# Patient Record
Sex: FEMALE | Race: WHITE | NOT HISPANIC OR LATINO | Employment: FULL TIME | URBAN - METROPOLITAN AREA
[De-identification: names, ages, dates, MRNs, and addresses within clinical notes are randomized per-mention and may not be internally consistent; named-entity substitution may affect disease eponyms.]

---

## 2018-05-25 ENCOUNTER — GYNECOLOGY VISIT (OUTPATIENT)
Dept: OBGYN | Facility: CLINIC | Age: 58
End: 2018-05-25
Payer: COMMERCIAL

## 2018-05-25 ENCOUNTER — HOSPITAL ENCOUNTER (OUTPATIENT)
Facility: MEDICAL CENTER | Age: 58
End: 2018-05-25
Attending: OBSTETRICS & GYNECOLOGY
Payer: COMMERCIAL

## 2018-05-25 VITALS
HEIGHT: 64 IN | WEIGHT: 157 LBS | DIASTOLIC BLOOD PRESSURE: 68 MMHG | BODY MASS INDEX: 26.8 KG/M2 | SYSTOLIC BLOOD PRESSURE: 103 MMHG

## 2018-05-25 DIAGNOSIS — Z01.419 WELL WOMAN EXAM: ICD-10-CM

## 2018-05-25 DIAGNOSIS — Z11.51 SCREENING FOR HUMAN PAPILLOMAVIRUS (HPV): ICD-10-CM

## 2018-05-25 DIAGNOSIS — M85.80 OSTEOPENIA, UNSPECIFIED LOCATION: ICD-10-CM

## 2018-05-25 DIAGNOSIS — Z12.4 SCREENING FOR MALIGNANT NEOPLASM OF CERVIX: ICD-10-CM

## 2018-05-25 DIAGNOSIS — Z12.31 SCREENING MAMMOGRAM, ENCOUNTER FOR: ICD-10-CM

## 2018-05-25 PROCEDURE — 88175 CYTOPATH C/V AUTO FLUID REDO: CPT

## 2018-05-25 PROCEDURE — 87624 HPV HI-RISK TYP POOLED RSLT: CPT

## 2018-05-25 PROCEDURE — 99396 PREV VISIT EST AGE 40-64: CPT | Performed by: OBSTETRICS & GYNECOLOGY

## 2018-05-25 NOTE — PROGRESS NOTES
Ethel Machado is a 57 y.o.  female who presents for her Annual Gynecologic Exam. Prior patient of mine from [facility] HPI Comments: Pt presents for her annual well woman exam.   Pt has no complaints.  No LMP recorded. Patient is postmenopausal.    Mammogram due 2017  Dexa scan - osteopenia, 2017  Pap  normal    Review of Systems:   Pertinent positives documented in HPI and all other systems reviewed & are negative    PGYN Hx: no hx STDs, no abnl pap, hx of ovarian cyst    All PMH, PSH, allergies, social history and FH reviewed and updated today:  Past Medical History:   Diagnosis Date   • Brachial neuritis or radiculitis NOS    • Cervicalgia    • Displacement of lumbar intervertebral disc without myelopathy    • FH: breast cancer     maternal   • Irregular menstrual cycle    • Morbid obesity (HCC)    • Onychomycosis     bilateral   • Other and unspecified ovarian cyst    • Pap smear 2008   • S/P appendectomy    • S/P augmentation mammoplasty     bilateral   • Screening mammogram 10/17/07   • Toxic shock syndrome (HCC)     fallopian tube dysfunction     Past Surgical History:   Procedure Laterality Date   • TIBIA PLATEAU ORIF Left 2015    Procedure: TIBIA PLATEAU ORIF;  Surgeon: Lang Rojas M.D.;  Location: SURGERY [facility];  Service:      Medications:   Current Outpatient Prescriptions Ordered in [facility]   Medication Sig Dispense Refill   • HYDROmorphone (DILAUDID) 2 MG Tab Take 0.5-1 Tabs by mouth every four hours as needed for Severe Pain. 20 Tab 0   • ibuprofen (MOTRIN) 600 MG Tab Take 1 Tab by mouth every 6 hours as needed for Moderate Pain or Fever (Temp> 101). 30 Tab 3   • cyclobenzaprine (FLEXERIL) 10 MG TABS Take 1 Tab by mouth 3 times a day as needed for Mild Pain or Muscle Spasms. 15 Tab 0     No current Epic-ordered facility-administered medications on file.      Patient has no known allergies.  Social History     Social History   • Marital status:       "Spouse name: N/A   • Number of children: N/A   • Years of education: N/A     Social History Main Topics   • Smoking status: Former Smoker     Years: 23.00     Types: Cigarettes   • Smokeless tobacco: Never Used   • Alcohol use Yes      Comment: rare   • Drug use: No   • Sexual activity: Yes     Partners: Male     Other Topics Concern   • Exercise No     Social History Narrative   • No narrative on file     Family History   Problem Relation Age of Onset   • Hyperlipidemia Mother    • Heart Attack Father    • Hypertension Father    • Heart Disease Father    • Hyperlipidemia Father    • Heart Disease Sister    • Hyperlipidemia Sister           Objective:   Vital measurements:  Blood pressure 103/68, height 1.626 m (5' 4\"), weight 71.2 kg (157 lb), not currently breastfeeding.  Body mass index is 26.95 kg/m². (Goal BM I>18 <25)    Physical Exam   Nursing note and vitals reviewed.  Constitutional: She is oriented to person, place, and time. She appears well-developed and well-nourished. No distress.     HEENT:   Head: Normocephalic and atraumatic.   Right Ear: External ear normal.   Left Ear: External ear normal.   Nose: Nose normal.   Eyes: Conjunctivae and EOM are normal. Pupils are equal, round, and reactive to light. No scleral icterus.     Neck: Normal range of motion. Neck supple. No tracheal deviation present. No thyromegaly present. No cervical or supraclavicular lymphadenopathy.    Pulmonary/Chest: Effort normal and breath sounds normal. No respiratory distress. She has no wheezes. She has no rales. She exhibits no tenderness.     Cardiovascular: Regular, rate and rhythm. No edema.    Breast:  Symmetrical, normal consistency without masses., No dimpling or skin changes, Normal nipples without discharge, no axillary lymphadenopathy    Abdominal: Soft. Bowel sounds are normal. She exhibits no distension and no mass. No tenderness. She has no rebound and no guarding.     Genitourinary:  Pelvic exam was performed " with patient supine.  External genitalia with no abnormal pigmentation, labial fusion, rash, tenderness, lesion or injury to the labia bilaterally.  BUS normal  Vagina is pink and moist with no lesions, foul discharge, erythema, tenderness or bleeding. No foreign body around the vagina or signs of injury.   Cervix exhibits no motion tenderness, no discharge and no friability, no lesions.   Uterus is not deviated, not enlarged, not fixed and not tender.  Right adnexa displays no mass, no tenderness and no fullness.  Left adnexa displays no mass, no tenderness and no fullness.     Musculoskeletal: Normal range of motion. Non tender. She exhibits no edema and no tenderness.     Lymphadenopathy: She has no cervical or supraclavicular adenopathy.     Neurological: She is alert and oriented to person, place, and time. She exhibits normal muscle tone.     Skin: Skin is warm and dry. No rash noted. She is not diaphoretic. No erythema. No pallor.     Psychiatric: She has a normal mood and affect. Her behavior is normal. Judgment and thought content normal.        Assessment:     1. Well woman exam     2. Screening mammogram, encounter for  MA-MAMMO SCREENING BILAT W/ARI W/CAD   3. Screening for human papillomavirus (HPV)     4. Screening for malignant neoplasm of cervix     5. Osteopenia, unspecified location           Plan:   Pap and physical exam performed  Self breast awareness discussed.  Counseling: breast self exam and mammography screening  Encouraged exercise and proper diet.  Mammograms annually. Patient given order for screening ari mammogram.  See medications and orders placed in encounter report.  Weight bearing exercise daily to strengthen bones  Calcium 1200mg daily and 800 IU daily

## 2018-05-26 DIAGNOSIS — Z11.51 SCREENING FOR HUMAN PAPILLOMAVIRUS (HPV): ICD-10-CM

## 2018-05-26 DIAGNOSIS — Z12.4 SCREENING FOR MALIGNANT NEOPLASM OF CERVIX: ICD-10-CM

## 2018-05-26 DIAGNOSIS — Z01.419 WELL WOMAN EXAM: ICD-10-CM

## 2018-05-30 LAB
CYTOLOGY REG CYTOL: NORMAL
HPV HR 12 DNA CVX QL NAA+PROBE: NEGATIVE
HPV16 DNA SPEC QL NAA+PROBE: NEGATIVE
HPV18 DNA SPEC QL NAA+PROBE: NEGATIVE
SPECIMEN SOURCE: NORMAL

## 2021-02-15 ENCOUNTER — NURSE TRIAGE (OUTPATIENT)
Dept: HEALTH INFORMATION MANAGEMENT | Facility: OTHER | Age: 61
End: 2021-02-15

## 2021-02-15 NOTE — TELEPHONE ENCOUNTER
Hands have been turning red and hot, then cold for several months.      Pt states when her hands turn cold, they turn back to her a normal color skin color.    Denies pain and/or any other symptoms.     Advised to see PCP.  Has an appointment to establish care with new PCP.  Pt only wants to see a MD, not a PA or APRN.     Advised to call back if symptoms worsen or go to the ED.

## 2021-02-22 ENCOUNTER — OFFICE VISIT (OUTPATIENT)
Dept: MEDICAL GROUP | Facility: PHYSICIAN GROUP | Age: 61
End: 2021-02-22
Payer: COMMERCIAL

## 2021-02-22 VITALS
OXYGEN SATURATION: 98 % | RESPIRATION RATE: 12 BRPM | WEIGHT: 141 LBS | DIASTOLIC BLOOD PRESSURE: 80 MMHG | SYSTOLIC BLOOD PRESSURE: 118 MMHG | TEMPERATURE: 97.6 F | HEIGHT: 63 IN | BODY MASS INDEX: 24.98 KG/M2 | HEART RATE: 72 BPM

## 2021-02-22 DIAGNOSIS — D64.9 NORMOCYTIC ANEMIA: ICD-10-CM

## 2021-02-22 DIAGNOSIS — M54.12 BRACHIAL NEURITIS OR RADICULITIS: ICD-10-CM

## 2021-02-22 DIAGNOSIS — E78.5 DYSLIPIDEMIA: ICD-10-CM

## 2021-02-22 PROCEDURE — 99203 OFFICE O/P NEW LOW 30 MIN: CPT | Performed by: INTERNAL MEDICINE

## 2021-02-22 RX ORDER — TRAMADOL HYDROCHLORIDE 50 MG/1
TABLET ORAL
COMMUNITY
End: 2021-02-22

## 2021-02-22 RX ORDER — PRAVASTATIN SODIUM 40 MG
40 TABLET ORAL
COMMUNITY
Start: 2021-02-01 | End: 2021-02-22 | Stop reason: SDUPTHER

## 2021-02-22 RX ORDER — HYDROMORPHONE HYDROCHLORIDE 2 MG/1
TABLET ORAL
COMMUNITY
End: 2021-02-22

## 2021-02-22 RX ORDER — CYCLOBENZAPRINE HCL 10 MG
TABLET ORAL
COMMUNITY
End: 2021-02-22

## 2021-02-22 RX ORDER — IBUPROFEN 600 MG/1
TABLET ORAL
COMMUNITY
End: 2021-02-22

## 2021-02-22 RX ORDER — HYDROCODONE BITARTRATE AND ACETAMINOPHEN 5; 325 MG/1; MG/1
TABLET ORAL
COMMUNITY
End: 2023-05-13

## 2021-02-22 RX ORDER — PRAVASTATIN SODIUM 40 MG
40 TABLET ORAL DAILY
Qty: 90 TABLET | Refills: 3 | Status: SHIPPED | OUTPATIENT
Start: 2021-02-22

## 2021-02-22 RX ORDER — DICLOFENAC SODIUM 75 MG/1
TABLET, DELAYED RELEASE ORAL
COMMUNITY
End: 2021-02-22

## 2021-02-22 RX ORDER — OXYCODONE HYDROCHLORIDE 5 MG/1
TABLET ORAL
COMMUNITY
End: 2021-02-22

## 2021-02-22 RX ORDER — OXYCODONE HCL 10 MG/1
TABLET, FILM COATED, EXTENDED RELEASE ORAL
COMMUNITY
End: 2021-02-22

## 2021-02-22 RX ORDER — OXYCODONE HYDROCHLORIDE 5 MG/1
CAPSULE ORAL
COMMUNITY
End: 2021-02-22

## 2021-02-22 ASSESSMENT — PATIENT HEALTH QUESTIONNAIRE - PHQ9: CLINICAL INTERPRETATION OF PHQ2 SCORE: 0

## 2021-02-22 NOTE — ASSESSMENT & PLAN NOTE
Patient reports she has been following with the [facility] in [place].  She needs refills and has not been into see her physician for over 1 year.  She needs to reestablish as her doctor there has moved and so she is here.

## 2021-02-22 NOTE — PROGRESS NOTES
Chief Complaint   Patient presents with   • Other     hand problem   • Establish Care       HISTORY OF PRESENT ILLNESS: Patient is a 60 y.o. female new patient who presents today to discuss the medical issues below.    Dyslipidemia  Patient reports she has been following with the [facility] in [place].  She needs refills and has not been into see her physician for over 1 year.  She needs to reestablish as her doctor there has moved and so she is here.      Normocytic anemia  Patient has not had labs x over year.      Brachial neuritis or radiculitis  Patient does not recall this diagnosis, on medical chart from 2015.  She does recall the diagnosis of reflex sympathetic dystrophy.  She states no problems with her feet.  She is a bit concerned about her hands, she has noted bilateral hands flushing and feeling hot.   Patient thinks she gets overheated but  is concerned so asked her to be see.  She has some numbness in her hands bilaterally when hands are hot and red.  She uses gloves when her hands get cold.        Patient Active Problem List    Diagnosis Date Noted   • Tibial plateau fracture 09/27/2015   • Leg pain 09/28/2015   • Fever 09/28/2015   • Normocytic anemia 09/28/2015   • Family history of coronary artery disease 05/07/2012   • FH: heart disease 04/18/2011   • Dyslipidemia 04/18/2011   • Other and unspecified ovarian cyst    • Cervicalgia    • Brachial neuritis or radiculitis        Allergies:Patient has no known allergies.    Current Outpatient Medications   Medication Sig Dispense Refill   • pravastatin (PRAVACHOL) 40 MG tablet Take 1 tablet by mouth every day. 90 tablet 3   • HYDROcodone-acetaminophen (NORCO) 5-325 MG Tab per tablet hydrocodone 5 mg-acetaminophen 325 mg tablet       No current facility-administered medications for this visit.         Past Medical History:   Diagnosis Date   • Brachial neuritis or radiculitis NOS    • Cervicalgia    • Displacement of lumbar intervertebral  "disc without myelopathy    • FH: breast cancer     maternal   • Irregular menstrual cycle    • Morbid obesity (HCC)    • Onychomycosis     bilateral   • Other and unspecified ovarian cyst    • Pap smear 2008   • S/P appendectomy    • S/P augmentation mammoplasty     bilateral   • Screening mammogram 10/17/07   • Toxic shock syndrome (HCC)     fallopian tube dysfunction       Social History     Tobacco Use   • Smoking status: Former Smoker     Years: 23.00     Types: Cigarettes   • Smokeless tobacco: Never Used   Substance Use Topics   • Alcohol use: Yes     Comment: rare   • Drug use: No       Family Status   Relation Name Status   • Mo  Alive   • Fa          MI with HTN   • Sis ##Sis1 Alive     Family History   Problem Relation Age of Onset   • Hyperlipidemia Mother    • Heart Attack Father    • Hypertension Father    • Heart Disease Father    • Hyperlipidemia Father    • Heart Disease Sister    • Hyperlipidemia Sister        ROS:    Respiratory: Negative for cough, sputum production, shortness of breath or wheezing.    Cardiovascular: Negative for chest pain, palpitations, orthopnea, dyspnea with exertion or edema.   Gastrointestinal: Negative for GI upset, nausea, vomiting, abdominal pain, constipation or diarrhea.   Genitourinary: Negative for dysuria, urgency, hesitancy or frequency.     Exam:    /80 (BP Location: Right arm, Patient Position: Sitting, BP Cuff Size: Adult)   Pulse 72   Temp 36.4 °C (97.6 °F) (Temporal)   Resp 12   Ht 1.6 m (5' 3\")   Wt 64 kg (141 lb)   SpO2 98%  Body mass index is 24.98 kg/m².  General:  Well nourished, well developed female in NAD.  Neck: Supple without bruit. Thyroid is not enlarged.  Pulmonary: Clear to ausculation and percussion.  Normal effort. No rales, rhonchi, or wheezing.  Cardiovascular: Regular rate and rhythm without murmur.   Abdomen: Normal bowel sounds soft and nontender no palpable liver spleen bladder mass.  Extremities: edema, she does " have flushing bilateral hands, good capillary refill, bilateral radial pulses 2+ symmetrical and even with elevation of hands above carotid level.    Neuro: Grossly nonfocal. Sensation intact bilateral UE  Psych: Alert and oriented to person, place, and time. Appropriate mood and conversation.        This dictation was created using voice recognition software. I have made reasonable attempts to correct errors, however, errors of grammar and content may exist.    Patient was seen for 25 minutes face to face of which more than 50% of the time was spent in counseling and coordination of care regarding the above problems.        Assessment/Plan:    1. Dyslipidemia  Refill on statin, check labs  - Comp Metabolic Panel; Future  - Lipid Profile; Future    2. Normocytic anemia  By history, check labs, no recent records avaialble  - CBC WITH DIFFERENTIAL; Future    3. Brachial neuritis or radiculitis  Patient associates this diagnosis with her leg and foot injury after a broken leg.  She now is having bilateral extremity flushing.  No pain discomfort she is simply concerned that there might be a problem here.  She does not smoke cigarettes examination is unremarkable we will proceed to laboratory assessment monitoring.  - Sed Rate; Future  - TSH WITH REFLEX TO FT4; Future

## 2021-02-22 NOTE — ASSESSMENT & PLAN NOTE
Patient does not recall this diagnosis, on medical chart from 2015.  She does recall the diagnosis of reflex sympathetic dystrophy.  She states no problems with her feet.  She is a bit concerned about her hands, she has noted bilateral hands flushing and feeling hot.   Patient thinks she gets overheated but  is concerned so asked her to be see.  She has some numbness in her hands bilaterally when hands are hot and red.  She uses gloves when her hands get cold.

## 2023-05-13 ENCOUNTER — OFFICE VISIT (OUTPATIENT)
Dept: URGENT CARE | Facility: PHYSICIAN GROUP | Age: 63
End: 2023-05-13
Payer: COMMERCIAL

## 2023-05-13 VITALS
WEIGHT: 146 LBS | SYSTOLIC BLOOD PRESSURE: 108 MMHG | OXYGEN SATURATION: 98 % | BODY MASS INDEX: 25.87 KG/M2 | HEIGHT: 63 IN | RESPIRATION RATE: 14 BRPM | DIASTOLIC BLOOD PRESSURE: 68 MMHG | HEART RATE: 90 BPM | TEMPERATURE: 98.1 F

## 2023-05-13 DIAGNOSIS — Z76.89 RETURN TO WORK EVALUATION: ICD-10-CM

## 2023-05-13 PROCEDURE — 99212 OFFICE O/P EST SF 10 MIN: CPT | Performed by: FAMILY MEDICINE

## 2023-05-13 NOTE — PROGRESS NOTES
Chief Complaint:    Chief Complaint   Patient presents with    Knee Injury     Pt Fell last Tuesday landed on L knee, was swollen with pain, not having any more pain or swelling, needs note to get back to works        History of Present Illness:    Fell onto left knee on 5/9/23, seen in ER, was put on work restrictions, but left knee feels fine now, ready to go back to work on 5/15/23, works as .      Past Medical History:    Past Medical History:   Diagnosis Date    Brachial neuritis or radiculitis NOS     Cervicalgia     Displacement of lumbar intervertebral disc without myelopathy     FH: breast cancer     maternal    Irregular menstrual cycle     Morbid obesity (HCC)     Onychomycosis     bilateral    Other and unspecified ovarian cyst     Pap smear 06/17/2008    S/P appendectomy     S/P augmentation mammoplasty     bilateral    Screening mammogram 10/17/07    Toxic shock syndrome (HCC)     fallopian tube dysfunction     Past Surgical History:    Past Surgical History:   Procedure Laterality Date    ORIF, FRACTURE, TIBIA, PLATEAU Left 9/27/2015    Procedure: TIBIA PLATEAU ORIF;  Surgeon: Lang Rojas M.D.;  Location: SURGERY [facility];  Service:      Social History:    Social History     Socioeconomic History    Marital status:      Spouse name: Not on file    Number of children: Not on file    Years of education: Not on file    Highest education level: Not on file   Occupational History    Not on file   Tobacco Use    Smoking status: Former     Years: 23.00     Types: Cigarettes    Smokeless tobacco: Never   Substance and Sexual Activity    Alcohol use: Yes     Comment: rare    Drug use: No    Sexual activity: Yes     Partners: Male   Other Topics Concern     Service Not Asked    Blood Transfusions Not Asked    Caffeine Concern Not Asked    Occupational Exposure Not Asked    Hobby Hazards Not Asked    Sleep Concern Not Asked    Stress Concern Not Asked    Weight Concern Not  "Asked    Special Diet Not Asked    Back Care Not Asked    Exercise No    Bike Helmet Not Asked    Seat Belt Not Asked    Self-Exams Not Asked   Social History Narrative    Not on file     Social Determinants of Health     Financial Resource Strain: Not on file   Food Insecurity: Not on file   Transportation Needs: Not on file   Physical Activity: Not on file   Stress: Not on file   Social Connections: Not on file   Intimate Partner Violence: Not on file   Housing Stability: Not on file     Family History:    Family History   Problem Relation Age of Onset    Hyperlipidemia Mother     Heart Attack Father     Hypertension Father     Heart Disease Father     Hyperlipidemia Father     Heart Disease Sister     Hyperlipidemia Sister      Medications:    Current Outpatient Medications on File Prior to Visit   Medication Sig Dispense Refill    pravastatin (PRAVACHOL) 40 MG tablet Take 1 tablet by mouth every day. 90 tablet 3     No current facility-administered medications on file prior to visit.     Allergies:    No Known Allergies      Vitals:    Vitals:    23 1127   BP: 108/68   Pulse: 90   Resp: 14   Temp: 36.7 °C (98.1 °F)   TempSrc: Temporal   SpO2: 98%   Weight: 66.2 kg (146 lb)   Height: 1.6 m (5' 3\")       Physical Exam:    Constitutional: Vital signs reviewed. Appears well-developed and well-nourished. No acute distress.   Eyes: Sclera white, conjunctivae clear.  ENT: External ears normal. Hearing normal.  Pulmonary/Chest: Respirations non-labored.  Musculoskeletal: Normal gait. Normal range of motion. No muscular atrophy or weakness.  Neurological: Alert and oriented to person, place, and time. Muscle tone normal. Coordination normal.   Skin: No rashes or lesions. Warm, dry, normal turgor.  Psychiatric: Normal mood and affect. Behavior is normal. Judgment and thought content normal.       Assessment / Plan & Medical Decision Makin. Return to work evaluation       Discussed with her DDX, management " options, and risks, benefits, and alternatives to treatment plan agreed upon.    Fell onto left knee on 5/9/23, seen in ER, was put on work restrictions, but left knee feels fine now, ready to go back to work on 5/15/23, works as .    Work note given - She may return to work on 5/15/23, regular full duty, without restrictions.    She will return to urgent care if needed.

## 2023-05-13 NOTE — LETTER
May 13, 2023         Patient: Ethel Machado   YOB: 1960   Date of Visit: 5/13/2023           To Whom it May Concern:    Ethel Machado was seen in my clinic on 5/13/2023.     She may return to work on 5/15/23, regular full duty, without restrictions.    If you have any questions or concerns, please don't hesitate to call.        Sincerely,           Clayton Mccurdy M.D.  Electronically Signed

## 2024-07-09 ENCOUNTER — APPOINTMENT (OUTPATIENT)
Dept: URGENT CARE | Facility: PHYSICIAN GROUP | Age: 64
End: 2024-07-09

## 2024-07-09 ENCOUNTER — OFFICE VISIT (OUTPATIENT)
Dept: URGENT CARE | Facility: PHYSICIAN GROUP | Age: 64
End: 2024-07-09
Payer: COMMERCIAL

## 2024-07-09 VITALS
OXYGEN SATURATION: 97 % | HEART RATE: 85 BPM | SYSTOLIC BLOOD PRESSURE: 100 MMHG | WEIGHT: 150.2 LBS | DIASTOLIC BLOOD PRESSURE: 60 MMHG | BODY MASS INDEX: 26.61 KG/M2 | RESPIRATION RATE: 16 BRPM | TEMPERATURE: 98.2 F

## 2024-07-09 DIAGNOSIS — R53.83 OTHER FATIGUE: ICD-10-CM

## 2024-07-09 DIAGNOSIS — G62.9 NEUROPATHY: ICD-10-CM

## 2024-07-09 PROCEDURE — 3074F SYST BP LT 130 MM HG: CPT | Performed by: STUDENT IN AN ORGANIZED HEALTH CARE EDUCATION/TRAINING PROGRAM

## 2024-07-09 PROCEDURE — 3078F DIAST BP <80 MM HG: CPT | Performed by: STUDENT IN AN ORGANIZED HEALTH CARE EDUCATION/TRAINING PROGRAM

## 2024-07-09 PROCEDURE — 99214 OFFICE O/P EST MOD 30 MIN: CPT | Performed by: STUDENT IN AN ORGANIZED HEALTH CARE EDUCATION/TRAINING PROGRAM

## 2024-07-09 RX ORDER — POTASSIUM CHLORIDE 20 MEQ/1
TABLET, EXTENDED RELEASE ORAL
COMMUNITY
Start: 2024-07-03